# Patient Record
Sex: FEMALE | Race: WHITE
[De-identification: names, ages, dates, MRNs, and addresses within clinical notes are randomized per-mention and may not be internally consistent; named-entity substitution may affect disease eponyms.]

---

## 2018-01-15 ENCOUNTER — HOSPITAL ENCOUNTER (EMERGENCY)
Dept: HOSPITAL 77 - KA.ED | Age: 53
Discharge: HOME | End: 2018-01-15
Payer: COMMERCIAL

## 2018-01-15 DIAGNOSIS — M94.0: Primary | ICD-10-CM

## 2018-01-15 LAB
CHLORIDE SERPL-SCNC: 103 MMOL/L (ref 98–115)
SODIUM SERPL-SCNC: 142 MMOL/L (ref 136–145)

## 2018-01-15 PROCEDURE — 36415 COLL VENOUS BLD VENIPUNCTURE: CPT

## 2018-01-15 PROCEDURE — 99284 EMERGENCY DEPT VISIT MOD MDM: CPT

## 2018-01-15 PROCEDURE — 80048 BASIC METABOLIC PNL TOTAL CA: CPT

## 2018-01-15 PROCEDURE — 84484 ASSAY OF TROPONIN QUANT: CPT

## 2018-01-15 PROCEDURE — 93005 ELECTROCARDIOGRAM TRACING: CPT

## 2018-01-15 PROCEDURE — 85025 COMPLETE CBC W/AUTO DIFF WBC: CPT

## 2018-01-15 NOTE — EDM.PDOC
ED HPI GENERAL MEDICAL PROBLEM





- General


Chief Complaint: Chest Pain


Stated Complaint: CHEST PAIN


Time Seen by Provider: 01/15/18 10:36


Source of Information: Reports: Patient


History Limitations: Reports: No Limitations





- History of Present Illness


INITIAL COMMENTS - FREE TEXT/NARRATIVE: 





Patient presents with a retro-sternal burning sensation.  This has been going 

on for about 2 1/2 days fairly constantly.  Friday night, when it started, was 

its worst at about 6/10; after a couple hours she took a Zantac which brought 

it down to about 3-4.  It also seemed to improve with drinking lots of water.  

Since then it has been quite constant without change from eating or activity 

and is currently about 3/10.  She's never had this before and denies any 

history of reflux/heartburn, MI, stents, hypertension or other heart problems.  

One of her parents had heart problems she says.  


  ** Mid-Sternal


Pain Score (Numeric/FACES): 3





- Related Data


 Allergies











Allergy/AdvReac Type Severity Reaction Status Date / Time


 


No Known Drug Allergies Allergy  Cannot Verified 01/15/18 10:19





   Remember  











Home Meds: 


 Home Meds





Vit C/E/Zn/Coppr/Lutein/Zeaxan [Preservision Areds 2 Softgel] 1 tab PO DAILY 01/

15/18 [History]











ED ROS GENERAL





- Review of Systems


Review Of Systems: See Below


Constitutional: Denies: Fever, Chills, Malaise, Weakness, Diaphoresis


HEENT: Denies: Throat Pain, Vision Change


Respiratory: Denies: Shortness of Breath, Cough


Cardiovascular: Reports: Chest Pain, Dyspnea on Exertion (when she climbs up to 

40 stairs at work she notices feeling winded and tight across the chest).  

Denies: Blood Pressure Problem, Lightheadedness, Syncope


GI/Abdominal: Denies: Abdominal Pain, Diarrhea, Nausea, Vomiting


Musculoskeletal: Reports: No Symptoms.  Denies: Neck Pain, Shoulder Pain, Arm 

Pain


Skin: Denies: Cyanosis, Jaundice, Mottled, Pallor, Diaphoresis


Neurological: Denies: Confusion, Dizziness, Headache, Seizure, Syncope, Trouble 

Speaking, Difficulty Walking


Psychiatric: Denies: Agitation, Anxiety, Confusion





ED EXAM, GENERAL





- Physical Exam


Exam: See Below


Exam Limited By: No Limitations


General Appearance: Alert, WD/WN, No Apparent Distress


Eye Exam: Bilateral Eye: EOMI, Normal Inspection, PERRL


Ears: Normal External Exam, Hearing Grossly Normal


Nose: Normal Inspection, No Blood


Throat/Mouth: Normal Inspection, Normal Lips, Normal Voice, No Airway Compromise


Head: Atraumatic, Normocephalic


Neck: Normal Inspection, Supple, Non-Tender, Full Range of Motion


Respiratory/Chest: No Respiratory Distress, Lungs Clear, Normal Breath Sounds, 

No Accessory Muscle Use


Cardiovascular: Normal Peripheral Pulses, Regular Rate, Rhythm, No Edema, No 

Gallop, No JVD, No Murmur, No Rub


Peripheral Pulses: 2+: Carotid (L), Carotid (R), Radial (L), Radial (R), 

Posterior Tibial (L), Posterior Tibial (R)


GI/Abdominal: Soft, Non-Tender, No Organomegaly, No Distention


Back Exam: Normal Inspection, Full Range of Motion.  No: CVA Tenderness (L), 

CVA Tenderness (R)


Extremities: Normal Inspection, Normal Range of Motion, Non-Tender, No Pedal 

Edema


Neurological: Alert, Oriented, Normal Cognition, No Motor/Sensory Deficits


Psychiatric: Normal Affect, Normal Mood


Skin Exam: Warm, Dry, Intact, Normal Color, No Rash





Course





- Vital Signs


Last Recorded V/S: 


 Last Vital Signs











Temp  99.3 F   01/15/18 10:23


 


Pulse  88   01/15/18 11:00


 


Resp  16   01/15/18 11:00


 


BP  108/74   01/15/18 11:00


 


Pulse Ox  95   01/15/18 11:00














- Orders/Labs/Meds


Orders: 


 Active Orders 24 hr











 Category Date Time Status


 


 EKG Documentation Completion [RC] ASDIRECTED Care  01/15/18 10:21 Active











Labs: 


 Laboratory Tests











  01/15/18 01/15/18 Range/Units





  10:30 10:30 


 


WBC  3.9 L   (5.0-10.0)  10^3/uL


 


RBC  4.81   (3.80-5.50)  10^6/uL


 


Hgb  14.0   (12.0-16.0)  g/dL


 


Hct  46.2   (37.0-47.0)  %


 


MCV  96.1 H   (82.0-92.0)  fL


 


MCH  29.0   (27.0-31.0)  pg


 


MCHC  30.2 L   (32.0-36.0)  g/dL


 


RDW  13.5   (11.5-14.5)  %


 


Plt Count  183   (150-300)  10^3/uL


 


MPV  9.4   (7.4-10.4)  fL


 


Neut % (Auto)  60.5   (50.0-70.0)  %


 


Lymph % (Auto)  26.1   (20.0-40.0)  %


 


Mono % (Auto)  9.7 H   (2.0-8.0)  %


 


Eos % (Auto)  3.2 H   (1.0-3.0)  %


 


Baso % (Auto)  0.5   (0.0-1.0)  %


 


Neut # (Auto)  2.4 L   (2.5-7.0)  10^3/uL


 


Lymph # (Auto)  1.0   (1.0-4.0)  10^3/uL


 


Mono # (Auto)  0.4   (0.1-0.8)  10^3/uL


 


Eos # (Auto)  0.1   (0.1-0.3)  10^3/uL


 


Baso # (Auto)  0.0   (0.0-0.1)  10^3/uL


 


Sodium   142  (136-145)  mmol/L


 


Potassium   4.4  (3.3-5.3)  mmol/L


 


Chloride   103  ()  mmol/L


 


Carbon Dioxide   29.3  (21.0-32.0)  mmol/L


 


BUN   12  (6-25)  mg/dL


 


Creatinine   0.74  (0.51-1.17)  mg/dL


 


Est Cr Clr Drug Dosing   93.99  mL/min


 


Estimated GFR (MDRD)   > 60  mL/min


 


Glucose   99  ()  mg/dL


 


Calcium   9.1  (8.7-10.3)  mg/dL


 


Troponin I   0.04  (0.00-0.070)  ng/mL











Meds: 


Medications














Discontinued Medications














Generic Name Dose Route Start Last Admin





  Trade Name Freq  PRN Reason Stop Dose Admin


 


Al Hydroxide/Mg Hydroxide  45 ml  01/15/18 10:22  01/15/18 10:39





  Gi Cocktail  PO  01/15/18 10:23  45 ml





  ONETIME ONE   Administration














- Re-Assessments/Exams


Free Text/Narrative Re-Assessment/Exam: 





01/15/18 11:38


GI cocktail seemed to improve slightly, from 3 to 2.  EKG and troponin are 

normal.  With the onset of symptoms 60 hours ago and the constant pain level, I 

am confident we have ruled out a cardiac etiology.  Palpation of the upper 

sternum aggravates/exacerbates the pain.  I feel this is most likely a 

costochondritis.  We discussed findings, expectations and treatment plan.  

Patient discharged in stable condition.





Departure





- Departure


Time of Disposition: 11:33


Disposition: Home, Self-Care 01


Condition: Good


Clinical Impression: 


 Anterior chest wall pain, Costochondritis, acute





Referrals: 


Josey Tinsley MD [Primary Care Provider] - 


Forms:  ED Department Discharge


Additional Instructions: 


1. Drink 8 cups of water daily.


2. Take Ibuprofen 400-600 mg three times a day as needed for pain control.


3. Recheck ASAP if worsening significantly, either with your PCP or in ER.





- My Orders


Last 24 Hours: 


My Active Orders





01/15/18 10:21


EKG Documentation Completion [RC] ASDIRECTED 














- Assessment/Plan


Last 24 Hours: 


My Active Orders





01/15/18 10:21


EKG Documentation Completion [RC] ASDIRECTED

## 2020-03-04 NOTE — EDM.PDOC
ED HPI GENERAL MEDICAL PROBLEM





- General


Chief Complaint: General


Stated Complaint: WEAKNESS/SHAKY


Time Seen by Provider: 03/04/20 12:16


Source of Information: Reports: Patient


History Limitations: Reports: No Limitations





- History of Present Illness


INITIAL COMMENTS - FREE TEXT/NARRATIVE: 





Patient is a 54-year-old female who presents to the emergency department via 

private vehicle for complaint of dizziness.  Patient states at approximately 11 

a.m. this morning, while in the standing position and doing light activity as a 

, she developed dizziness.  Described as lightheadedness and not the 

room spinning.  She told her coworker how she was feeling and they decided to 

present to emergency department.  Patient states that she takes diet pills and 

although takes them intermittently she did take one this morning.  Her 

breakfast consisted of a Pop Tart.  She denies any other medication.  Patient 

states symptoms are almost resolved, also denies at this time, chest pain, 

shortness of breath, nausea, vomiting, diarrhea, abdominal pain, headache, 

blurry vision, numbness or tingling, out of country travel, upper respiratory 

symptoms, or heart palpitations.  Patient does complain of urinary urgency 

without dysuria


Onset: Today


Onset Time: 11:00


Duration: Improving


Quality: Reports: Other (Dizzy)


Improves with: Reports: Other (Spontaneously)


Worsens with: Reports: None


Context: Reports: Other (While standing and with minimal activity)


Associated Symptoms: Reports: No Other Symptoms.  Denies: Chest Pain, Cough, 

Diaphoresis, Fever/Chills, Headaches, Nausea/Vomiting, Seizure, Shortness of 

Breath, Syncope





- Related Data


 Allergies











Allergy/AdvReac Type Severity Reaction Status Date / Time


 


No Known Drug Allergies Allergy  Cannot Verified 03/04/20 12:01





   Remember  











Home Meds: 


 Home Meds





Vit C/E/Zn/Coppr/Lutein/Zeaxan [Preservision Areds 2 Softgel] 1 tab PO DAILY 01/

15/18 [History]


Cephalexin [Keflex] 500 mg PO TID #21 capsule 03/04/20 [Rx]


Ibuprofen 800 mg PO DAILY PRN 03/04/20 [History]


Non-Formulary Medication [NF Drug] 1 tab PO DAILY 03/04/20 [History]











Past Medical History


HEENT History: Reports: Macular Degeneration


OB/GYN History: Reports: Pregnancy





Social & Family History





- Family History


Cardiac: Reports: Heart Failure





- Caffeine Use


Caffeine Use: Reports: Coffee


Other Caffeine Use: 3-4 cups/daily





ED ROS GENERAL





- Review of Systems


Review Of Systems: Comprehensive ROS is negative, except as noted in HPI.


Constitutional: Reports: No Symptoms


HEENT: Reports: No Symptoms


Respiratory: Reports: No Symptoms


Cardiovascular: Reports: No Symptoms


Endocrine: Reports: No Symptoms


GI/Abdominal: Reports: No Symptoms


: Reports: No Symptoms


Musculoskeletal: Reports: No Symptoms


Skin: Reports: No Symptoms


Neurological: Reports: Dizziness.  Denies: Headache, Numbness, Paresthesia, 

Seizure, Syncope, Tingling, Trouble Speaking, Weakness, Change in Speech


Psychiatric: Reports: No Symptoms


Hematologic/Lymphatic: Reports: No Symptoms


Immunologic: Reports: No Symptoms





ED EXAM, GENERAL





- Physical Exam


Exam: See Below


Exam Limited By: No Limitations


General Appearance: Alert, WD/WN, No Apparent Distress


Eye Exam: Bilateral Eye: Normal Inspection


Ears: Normal External Exam, Normal Canal, Normal TMs


Nose: Normal Inspection, Normal Mucosa, No Blood


Throat/Mouth: Normal Inspection, Normal Oropharynx, No Airway Compromise


Head: Atraumatic, Normocephalic


Neck: Normal Inspection, Supple, Non-Tender, Full Range of Motion


Respiratory/Chest: No Respiratory Distress, Lungs Clear, Normal Breath Sounds, 

No Accessory Muscle Use, Chest Non-Tender


Cardiovascular: Normal Peripheral Pulses, Regular Rate, Rhythm, No Murmur


GI/Abdominal: Normal Bowel Sounds, Soft, Non-Tender, No Organomegaly, No 

Distention, No Abnormal Bruit, No Mass


Back Exam: Normal Inspection.  No: CVA Tenderness (L), CVA Tenderness (R)


Extremities: Normal Inspection, No Pedal Edema


Neurological: Alert, Oriented, CN II-XII Intact, Normal Cognition, No Motor/

Sensory Deficits, Other (No nystagmus)


Psychiatric: Normal Affect, Normal Mood


Skin Exam: Warm, Dry, Intact, Normal Color, No Rash


Lymphatic: No Adenopathy





EKG INTERPRETATION


EKG Date: 03/04/20


Time: 12:30


Rhythm: NSR


Rate (Beats/Min): 78


Axis: Normal


P-Wave: Present


QRS: Normal


ST-T: Normal


QT: Normal


Comparison: NA - No Prior EKG





Course





- Vital Signs


Last Recorded V/S: 


 Last Vital Signs











Temp  97.4 F   03/04/20 12:00


 


Pulse  101 H  03/04/20 12:00


 


Resp  180 H  03/04/20 12:00


 


BP  126/71   03/04/20 12:00


 


Pulse Ox  96   03/04/20 12:00














- Orders/Labs/Meds


Orders: 


 Active Orders 24 hr











 Category Date Time Status


 


 EKG Documentation Completion [RC] ASDIRECTED Care  03/04/20 12:15 Active


 


 Peripheral IV Care [RC] .AS DIRECTED Care  03/04/20 12:16 Active


 


 Peripheral IV Care [RC] .AS DIRECTED Care  03/04/20 12:16 Ordered


 


 A1C [GLYCOSYLATED HEMOGLOBIN,HGBA1C] [CHEM] Stat Lab  03/04/20 12:54 Ordered


 


 CULTURE URINE [RM] Stat Lab  03/04/20 12:47 Ordered


 


 Sodium Chloride 0.9% @ 999 MLS/HR (1000ml) Med  03/04/20 12:16 Ordered





 Sodium Chloride 0.9% [Normal Saline] 1,000 ml   





 IV .BOLUS   


 


 Sodium Chloride 0.9% [Saline Flush] Med  03/04/20 12:16 Active





 10 ml FLUSH Q8HR PRN   


 


 Sodium Chloride 0.9% [Saline Flush] Med  03/04/20 12:16 Ordered





 10 ml FLUSH Q8HR PRN   


 


 Peripheral IV Insertion Adult [OM.PC] Routine Oth  03/04/20 12:16 Ordered


 


 Peripheral IV Insertion Adult [OM.PC] Routine Oth  03/04/20 12:16 Ordered


 


 EKG 12 Lead [EK] Routine Ther  03/04/20 12:14 Ordered








 Medication Orders





Sodium Chloride (Normal Saline)  1,000 mls @ 999 mls/hr IV .BOLUS ONE


   Stop: 03/04/20 13:16


Sodium Chloride (Saline Flush)  10 ml FLUSH Q8HR PRN


   PRN Reason: keep vein open


Sodium Chloride (Saline Flush)  10 ml FLUSH Q8HR PRN


   PRN Reason: keep vein open








Labs: 


 Laboratory Tests











  03/04/20 03/04/20 03/04/20 Range/Units





  12:12 12:12 12:14 


 


WBC  5.05    (5.00-10.00)  10^3/uL


 


RBC  4.50    (3.80-5.50)  10^6/uL


 


Hgb  14.2    (12.0-16.0)  g/dL


 


Hct  43.0    (37.0-47.0)  %


 


MCV  95.6 H    (82.0-92.0)  fL


 


MCH  31.6 H    (27.0-31.0)  pg


 


MCHC  33.0    (32.0-36.0)  g/dL


 


RDW  12.6    (11.5-14.5)  %


 


Plt Count  178    (150-400)  10^3/uL


 


MPV  10.9 H    (7.4-10.4)  fL


 


Immature Gran % (Auto)  0.0    (0.0-5.0)  %


 


Neut % (Auto)  68.7    (50.0-70.0)  %


 


Lymph % (Auto)  22.6    (20.0-40.0)  %


 


Mono % (Auto)  6.3    (2.0-8.0)  %


 


Eos % (Auto)  1.8    (1.0-3.0)  %


 


Baso % (Auto)  0.6    (0.0-1.0)  %


 


Immature Gran # (Auto)  0.00    (0.00-0.50)  10^3/uL


 


Neut # (Auto)  3.47    (2.50-7.00)  10^3/uL


 


Lymph # (Auto)  1.14    (1.00-4.00)  10^3/uL


 


Mono # (Auto)  0.32    (0.10-0.80)  10^3/uL


 


Eos # (Auto)  0.09 L    (0.10-0.30)  10^3/uL


 


Baso # (Auto)  0.03    (0.00-0.10)  10^3/uL


 


Sodium   140   (136-145)  mmol/L


 


Potassium   3.7   (3.3-5.3)  mmol/L


 


Chloride   102   ()  mmol/L


 


Carbon Dioxide   26.5   (21.0-32.0)  mmol/L


 


Anion Gap   15.2 H   (5-15)  mmol/L


 


BUN   14   (6-25)  mg/dL


 


Creatinine   0.81   (0.51-1.17)  mg/dL


 


Est Cr Clr Drug Dosing   86.86   mL/min


 


Estimated GFR (MDRD)   > 60   mL/min


 


Glucose   116 H  (75 - 99) mg/dL


 


Calcium   8.9   (8.7-10.3)  mg/dL


 


Total Bilirubin   0.4   (0.2-1.0)  mg/dL


 


AST   20   (15-37)  U/L


 


ALT   22   (12-78)  U/L


 


Alkaline Phosphatase   66   ()  IU/L


 


Total Protein   7.5   (6.4-8.2)  g/dL


 


Albumin   3.94   (3.00-4.80)  g/dL


 


Specimen Type    Urincc  


 


Urine Color    Yellow  (YELLOW)  


 


Urine Appearance    Slightly cloudy H  (CLEAR)  


 


Urine pH    6.0  (5.0-9.0)  


 


Ur Specific Gravity    <= 1.005  (1.005-1.030)  


 


Urine Protein    Negative  (NEGATIVE)  mg/dL


 


Urine Glucose (UA)    Negative  (NEGATIVE)  mg/dL


 


Urine Ketones    Negative  (NEGATIVE)  mg/dL


 


Urine Occult Blood    Trace-intact H  (NEGATIVE)  


 


Urine Nitrite    Negative  (NEGATIVE)  


 


Urine Bilirubin    Negative  (NEGATIVE)  


 


Urine Urobilinogen    0.2  (0.2-1.0)  E.U./dL


 


Ur Leukocyte Esterase    Moderate H  (NEGATIVE)  


 


Urine RBC    0-5  (0-5)  /HPF


 


Urine WBC    20-30 H  (0-5)  /HPF


 


Ur Epithelial Cells    Moderate H  /LPF


 


Urine Bacteria    Moderate H  (NONE TO FEW)  /HPF


 


Urine Yeast    Few H  (NEGATIVE)  /HPF











Meds: 


Medications











Generic Name Dose Route Start Last Admin





  Trade Name Freq  PRN Reason Stop Dose Admin


 


Sodium Chloride  1,000 mls @ 999 mls/hr  03/04/20 12:16  





  Normal Saline  IV  03/04/20 13:16  





  .BOLUS ONE   





     





     





     





     


 


Sodium Chloride  10 ml  03/04/20 12:16  





  Saline Flush  FLUSH   





  Q8HR PRN   





  keep vein open   





     





     





     


 


Sodium Chloride  10 ml  03/04/20 12:16  





  Saline Flush  FLUSH   





  Q8HR PRN   





  keep vein open   





     





     





     














Discontinued Medications














Generic Name Dose Route Start Last Admin





  Trade Name Freq  PRN Reason Stop Dose Admin


 


Ceftriaxone Sodium  1 gm  03/04/20 12:45  





  Rocephin  IVPUSH  03/04/20 12:46  





  ONETIME ONE   





     





     





     





     














- Re-Assessments/Exams


Free Text/Narrative Re-Assessment/Exam: 





03/04/20 13:02


Patient afebrile, vital signs stable, nontoxic appearing, symptoms resolved.  

Administered 1 g Rocephin IV in ER and will be given prescription for Keflex.  

Patient will follow-up with Dr. Marley in 2 days.





Departure





- Departure


Time of Disposition: 13:04


Disposition: Home, Self-Care 01


Condition: Good


Clinical Impression: 


 UTI, Urinary tract infectious disease, Lightheadedness








- Discharge Information


Instructions:  Antibiotic Medicine, Adult, Easy-to-Read, Urinalysis Test, 

Dizziness, Easy-to-Read, Urinary Tract Infection, Adult, Easy-to-Read, Urine 

Culture and Sensitivity Testing


Referrals: 


Josey Tinsley MD [Primary Care Provider] - 


Forms:  ED Department Discharge


Additional Instructions: 


Follow-up with Dr. Marley on Friday.  Return to emergency department sooner 

symptoms continue or worsen.





Sepsis Event Note





- Evaluation


Sepsis Screening Result: No Definite Risk





- Focused Exam


Vital Signs: 


 Vital Signs











  Temp Pulse Resp BP Pulse Ox


 


 03/04/20 12:00  97.4 F  101 H  180 H  126/71  96











Date Exam was Performed: 03/04/20


Time Exam was Performed: 13:00





- My Orders


Last 24 Hours: 


My Active Orders





03/04/20 12:14


EKG 12 Lead [EK] Routine 





03/04/20 12:15


EKG Documentation Completion [RC] ASDIRECTED 





03/04/20 12:16


Peripheral IV Care [RC] .AS DIRECTED 


Peripheral IV Care [RC] .AS DIRECTED 


Sodium Chloride 0.9% @ 999 MLS/HR (1000ml) Sodium Chloride 0.9% [Normal Saline] 

1,000 ml IV .BOLUS 


Sodium Chloride 0.9% [Saline Flush]   10 ml FLUSH Q8HR PRN 


Sodium Chloride 0.9% [Saline Flush]   10 ml FLUSH Q8HR PRN 


Peripheral IV Insertion Adult [OM.PC] Routine 


Peripheral IV Insertion Adult [OM.PC] Routine 





03/04/20 12:47


CULTURE URINE [RM] Stat 





03/04/20 12:54


A1C [GLYCOSYLATED HEMOGLOBIN,HGBA1C] [CHEM] Stat 














- Assessment/Plan


Last 24 Hours: 


My Active Orders





03/04/20 12:14


EKG 12 Lead [EK] Routine 





03/04/20 12:15


EKG Documentation Completion [RC] ASDIRECTED 





03/04/20 12:16


Peripheral IV Care [RC] .AS DIRECTED 


Peripheral IV Care [RC] .AS DIRECTED 


Sodium Chloride 0.9% @ 999 MLS/HR (1000ml) Sodium Chloride 0.9% [Normal Saline] 

1,000 ml IV .BOLUS 


Sodium Chloride 0.9% [Saline Flush]   10 ml FLUSH Q8HR PRN 


Sodium Chloride 0.9% [Saline Flush]   10 ml FLUSH Q8HR PRN 


Peripheral IV Insertion Adult [OM.PC] Routine 


Peripheral IV Insertion Adult [OM.PC] Routine 





03/04/20 12:47


CULTURE URINE [RM] Stat 





03/04/20 12:54


A1C [GLYCOSYLATED HEMOGLOBIN,HGBA1C] [CHEM] Stat 











Assessment:: 





Urinary tract infection


Plan: 





Follow-up with PCP in 2 days

## 2020-05-26 NOTE — OR
DATE OF SURGERY:  05/26/2020

 

SURGEON:  Scott Tobar MD

 

PREOPERATIVE DIAGNOSIS:  Colon cancer screening.

 

POSTOPERATIVE DIAGNOSIS:  Rectal polyp.

 

OPERATION PERFORMED:  Colonoscopy with polypectomy.

 

INDICATIONS FOR SURGERY:  This 54-year-old female is referred for her initial

screening colonoscopy.  She has noticed some recent change in bowel pattern with

some increased constipation.

 

FINDINGS:  A single polyp was noted on today's exam.  It is a 5 mm polyp in the

rectum located 1 cm from the anal verge.  The remainder of the colon and rectum

appear normal.  The terminal ileum also appears normal.

 

DESCRIPTION OF PROCEDURE:  The patient was taken to the operating room.  She was

given intravenous sedation and with her in the left lateral decubitus position,

digital rectal exam was performed showing no rectal masses.  The Olympus

colonoscope was inserted into the rectum.  Retroflexed examination of the rectal

canal was performed.  In the rectum, the above-described polyp was identified.

It is removed with a cautery snare and retrieved.  The scope was then carefully

advanced under direct visualization through the entire length of the colon until

the cecum is reached.  The patient's colon was somewhat tortuous and this did

require persistent careful manipulation and hand pressure, but eventually the

cecum is able to be identified and cannulated.  The ileocecal valve was

cannulated and the terminal ileum examined and appeared normal.  The light was

noted to transilluminate the abdominal wall in the right lower quadrant and the

appendiceal orifice also viewed.  The scope was then slowly withdrawn

sequentially re-examining the colonic segments until the entire colon and rectum

have been fully examined.  The scope was removed and the patient was taken from

the operating room in satisfactory condition.

 

ESTIMATED BLOOD LOSS:  Zero.

 

COMPLICATIONS:  None.

 

PROGNOSIS:  Good.

 

DD:  05/26/2020 12:46:57

DT:  05/26/2020 13:32:00

Job #:  [id number]

## 2020-05-26 NOTE — PCM.OPNOTE
- General Post-Op/Procedure Note


Date of Surgery/Procedure: 05/26/20


Operative Procedure(s): Colonoscopy with Polypectomy


Findings: 





Small rectal polyp


Colon and terminal ileum otherwise normal


Pre Op Diagnosis: Colon Cancer Screening


Post-Op Diagnosis: Colon Polyp


Anesthesia Technique: MAC


Primary Surgeon: Scott Tobar


Pathology: 





Rectal Polyp


EBL in mLs: 0


Complications: None


Condition: Good

## 2020-05-26 NOTE — PCM.PN
- General Info


Date of Service: 05/26/20





- Review of Systems


Systems Review Comment:: 





54-year-old female referred for colonoscopy.  This is her initial colonoscopy.  

She has not had any recent rectal bleeding but does note a recent change in 

bowel pattern with increased constipation.  She also states that her 

grandmother had colon cancer.  I have discussed the proposed colonoscopy with 

the patient.  Risks such as but not limited to bleeding and GI injury reviewed.

  She agrees to proceed.  Her recent history and physical is reviewed and no 

significant changes are noted.





- Patient Data


Weight - Most Recent: 85.729 kg


Lab Results Last 24 Hours: 


 Laboratory Results - last 24 hr











  05/26/20 Range/Units





  07:40 


 


SARS-CoV-2 RNA (RT-PCR)  Negative  (NEGATIVE)  











Med Orders - Current: 


 Current Medications





Lactated Ringer's (Ringers, Lactated)  1,000 mls @ 50 mls/hr IV ASDIRECTED AMRITA


Sodium Chloride (Saline Flush)  10 ml FLUSH Q8HR PRN


   PRN Reason: keep vein open





Discontinued Medications





Midazolam HCl (Versed 1 Mg/Ml) Confirm Administered Dose 2 mg .ROUTE .STK-MED 

ONE


   Stop: 05/26/20 10:33


Propofol (Diprivan  20 Ml) Confirm Administered Dose 200 mg .ROUTE .STK-MED ONE


   Stop: 05/26/20 10:33











- Problem List Review


Problem List Initiated/Reviewed/Updated: Yes





- My Orders


Last 24 Hours: 


My Active Orders





05/26/20 11:00


Peripheral IV Care [RC] .AS DIRECTED 


Lactated Ringers [Ringers, Lactated] 1,000 ml IV ASDIRECTED 


Sodium Chloride 0.9% [Saline Flush]   10 ml FLUSH Q8HR PRN 


Peripheral IV Insertion Adult [OM.PC] Routine 





05/26/20 11:23


HCG QUALITATIVE,SERUM [CHEM] Routine 





05/26/20 12:00


Verify Patient Consent Obtain [RC] ASDIRECTED 





05/26/20 Breakfast


Nothing Per Oral Diet [DIET] 














- Assessment


Assessment:: 





Colon cancer screening


Change in bowel pattern





- Plan


Plan:: 





Colonoscopy